# Patient Record
Sex: FEMALE | Race: OTHER | Employment: UNEMPLOYED | URBAN - METROPOLITAN AREA
[De-identification: names, ages, dates, MRNs, and addresses within clinical notes are randomized per-mention and may not be internally consistent; named-entity substitution may affect disease eponyms.]

---

## 2021-11-08 ENCOUNTER — TRANSCRIBE ORDER (OUTPATIENT)
Dept: SCHEDULING | Age: 51
End: 2021-11-08

## 2021-11-08 DIAGNOSIS — Z12.31 VISIT FOR SCREENING MAMMOGRAM: Primary | ICD-10-CM

## 2021-11-18 ENCOUNTER — OFFICE VISIT (OUTPATIENT)
Dept: FAMILY PLANNING/WOMEN'S HEALTH CLINIC | Age: 51
End: 2021-11-18

## 2021-11-18 ENCOUNTER — HOSPITAL ENCOUNTER (OUTPATIENT)
Dept: MAMMOGRAPHY | Age: 51
Discharge: HOME | End: 2021-11-18
Attending: NURSE PRACTITIONER

## 2021-11-18 DIAGNOSIS — Z12.31 SCREENING MAMMOGRAM FOR BREAST CANCER: Primary | ICD-10-CM

## 2021-11-18 DIAGNOSIS — Z12.31 BREAST CANCER SCREENING BY MAMMOGRAM: ICD-10-CM

## 2021-11-18 PROCEDURE — 77067 SCR MAMMO BI INCL CAD: CPT

## 2021-11-18 NOTE — PROGRESS NOTES
HISTORY OF PRESENT ILLNESS  Janet Fregoso is a 49 y.o. female here for [facility]. [facility]  #[id number]  HPI Ms. Edd La, is a new pt to EW. She had bilateral breast implants placed in [place] 15 years ago. For the last 2 weeks both breasts have become swollen, larger and painful. The right breast seems to have more pain than the left. The pain is more at night. She denies breast lumps and nipple discharge. She denies skin color changes and skin changes/retraction/dimpling. The surgical sites healed well 15 years ago and she hasn't had any complications. No revisions to date. Her last mammogram was 15 years ago. LMP over 1 year ago. She denies use of hormones. She is due for a Pap and will return to [facility] for this. FHX of breast cancer in her maternal aunt, age 61. Review of Systems   Constitutional: Negative. Respiratory: Negative. Cardiovascular: Negative. Skin: Negative. Physical Exam  Nursing note reviewed. Constitutional:       Appearance: Normal appearance. Chest:   Breasts:      Right: Tenderness present. No swelling, bleeding, inverted nipple, mass, nipple discharge, skin change, axillary adenopathy or supraclavicular adenopathy. Left: Tenderness present. No swelling, bleeding, inverted nipple, mass, nipple discharge, skin change, axillary adenopathy or supraclavicular adenopathy. Lymphadenopathy:      Upper Body:      Right upper body: No supraclavicular, axillary or pectoral adenopathy. Left upper body: No supraclavicular, axillary or pectoral adenopathy. Skin:     General: Skin is warm and dry. Neurological:      Mental Status: She is alert and oriented to person, place, and time. Psychiatric:         Mood and Affect: Mood normal.         Behavior: Behavior normal.         Thought Content: Thought content normal.         Judgment: Judgment normal.         ASSESSMENT and PLAN  1.  EWL  2. CBE benign      -bilateral breast implants placed 15 years ago in [facility]  3. Bilateral breast pain, non-focal      -recommend OTC NSAIDS  4. Menopause  5. In consultation with Dr. Susan Anne this evening, we will go forward with a screening mammogram   6. Return to [facility] for a Pap/[facility]WE, appt to be made    Ms.  Burton Harrison agrees to plan

## 2021-11-18 NOTE — PROGRESS NOTES
EVERY [facility]OM[facility] LIFE HISTORY QUESTIONNAIRE       No Yes Comments   Has a doctor ever seen or felt anything wrong with your breast? [x]                                  []                                     Have you ever had a breast biopsy? [x]                                  []                                          When and where was last mammogram performed? First was done in [place], 15 years ago? Have you ever been told that there was a problem on your mammogram?   No Yes Comments   [x]                                  []                                       Do you have breast implants? No Yes Comments   []                                  [x]                                       When was your last Pap test performed? 15 years ago  Have you ever had an abnormal Pap test?   No Yes Comments   [x]                                  []                                       Have you had a hysterectomy? No Yes Comments (why)   [x]                                  []                                       Have you been through menopause? No Yes Date of LMP   [x]                                  []                                  It's been over a year, not sure exact date. Did your mother take GLADYS? No Yes Unknown   []                                  []                                  X     Do you have a history of HIV exposure? No Yes    [x]                                  []                                       Have you ever been diagnosed with any type of Cancer   No Yes Comments (type,when,where,type of treatment   [x]                                  []                                          Has a family member been diagnosed with breast or ovarian cancer?    No Yes Comments (which family members, and type   []                                  [x]                                  Maternal Aunt, breast cancer, 61     Are you taking hormone replacement therapy (HRT)     No Yes Comments   [x] []                                       How many times have you been pregnant? 3       Number of live births ? 3    Are you experiencing any of the following? No Yes Comments   Nipple Discharge [x]                                  []                                     Breast Lump/Masses [x]                                  []                                     Breast Skin Changes [x]                                  []                                          No Yes Comments   Vaginal Discharge [x]                                  []                                     Abnormal/unusual vaginal bleeding [x]                                  []                                         Are you experiencing any other health problems? Breast implants inserted 15 years ago. Both breast swollen but right worse. More pain at night. No nipple discharge. Age at first period? 13  Age at first birth?23    Ht--1.75m  Wt--73k    [facility]  number is [phone]

## 2021-11-23 ENCOUNTER — TELEPHONE (OUTPATIENT)
Dept: FAMILY PLANNING/WOMEN'S HEALTH CLINIC | Age: 51
End: 2021-11-23

## 2021-11-23 DIAGNOSIS — N64.89 BREAST ASYMMETRY: Primary | ICD-10-CM

## 2022-01-26 ENCOUNTER — TELEPHONE (OUTPATIENT)
Dept: FAMILY PLANNING/WOMEN'S HEALTH CLINIC | Age: 52
End: 2022-01-26

## 2022-01-26 NOTE — TELEPHONE ENCOUNTER
Duration of call 24 minutes. [facility] [employer]  used [phone]    Nurse called pt in Re: need for AI's patient did not show to her scheduled appointment for this on 01/18/2022. Per patient she states that she does not need additional images to her breast that she is fine and does not have any problems at this time. She does not feel like one breast is bigger than the other and if so it may be because she was lifting weights. Pt states that she spoke with a Doctor in [place] and told her not to worry. Nurse  Educated the patient that the only way to know if her breast a normal is by having her mammogram completed. Discussed that the reason we do screening mammograms and follow up with the results and the recommendation is for the prevention of breast cancer. Nurse also told patient that we cannot force her and she can refuse care if that's what she prefers but I would like for her to be well informed before making her decision. Patient will think about it and will let us know on her 2/2/2022 appt for pap smear. Nurse offered to change the appt. To [id number] in case that she does want the images because her current appt. At 2pm is for a pap only. Nurse trying to work with the mammogram suite to see if they can complete her AI's if patient agrees on 2/2/22.